# Patient Record
Sex: FEMALE | Race: OTHER | NOT HISPANIC OR LATINO | Employment: UNEMPLOYED | URBAN - METROPOLITAN AREA
[De-identification: names, ages, dates, MRNs, and addresses within clinical notes are randomized per-mention and may not be internally consistent; named-entity substitution may affect disease eponyms.]

---

## 2019-09-29 ENCOUNTER — HOSPITAL ENCOUNTER (OUTPATIENT)
Facility: HOSPITAL | Age: 24
Discharge: HOME | End: 2019-09-29
Attending: OBSTETRICS & GYNECOLOGY | Admitting: OBSTETRICS & GYNECOLOGY

## 2019-09-29 VITALS
SYSTOLIC BLOOD PRESSURE: 104 MMHG | OXYGEN SATURATION: 97 % | WEIGHT: 177.47 LBS | HEIGHT: 61 IN | BODY MASS INDEX: 33.51 KG/M2 | RESPIRATION RATE: 17 BRPM | TEMPERATURE: 97.7 F | HEART RATE: 94 BPM | DIASTOLIC BLOOD PRESSURE: 67 MMHG

## 2019-09-29 PROBLEM — O47.00 PRETERM CONTRACTIONS: Status: ACTIVE | Noted: 2019-09-29

## 2019-09-29 RX ORDER — FERROUS SULFATE TAB EC 324 MG (65 MG FE EQUIVALENT) 324 (65 FE) MG
324 TABLET DELAYED RESPONSE ORAL
COMMUNITY
End: 2022-06-06

## 2019-09-29 RX ORDER — PRENATAL VIT NO.126/IRON/FOLIC 28MG-0.8MG
1 TABLET ORAL DAILY
COMMUNITY
End: 2022-06-06

## 2019-09-30 ENCOUNTER — HOSPITAL ENCOUNTER (INPATIENT)
Facility: HOSPITAL | Age: 24
LOS: 2 days | Discharge: HOME | End: 2019-10-02
Attending: OBSTETRICS & GYNECOLOGY | Admitting: OBSTETRICS & GYNECOLOGY

## 2019-09-30 ENCOUNTER — APPOINTMENT (OUTPATIENT)
Dept: ULTRASOUND IMAGING | Facility: HOSPITAL | Age: 24
End: 2019-09-30

## 2019-09-30 ENCOUNTER — ANESTHESIA (OUTPATIENT)
Dept: LABOR AND DELIVERY | Facility: HOSPITAL | Age: 24
End: 2019-09-30

## 2019-09-30 ENCOUNTER — ANESTHESIA EVENT (OUTPATIENT)
Dept: LABOR AND DELIVERY | Facility: HOSPITAL | Age: 24
End: 2019-09-30

## 2019-09-30 PROBLEM — Z34.90 TERM PREGNANCY: Status: ACTIVE | Noted: 2019-09-30

## 2019-09-30 LAB
ABO GROUP BLD: NORMAL
AMPHET+METHAMPHET UR QL: NEGATIVE
BARBITURATES UR QL SCN: NEGATIVE
BASOPHILS # BLD AUTO: 0 10*3/MM3 (ref 0–0.2)
BASOPHILS NFR BLD AUTO: 0.3 % (ref 0–1.5)
BENZODIAZ UR QL SCN: NEGATIVE
BLD GP AB SCN SERPL QL: NEGATIVE
CANNABINOIDS SERPL QL: POSITIVE
COCAINE UR QL: NEGATIVE
CREAT UR-MCNC: 150.4 MG/DL
DEPRECATED RDW RBC AUTO: 43.8 FL (ref 37–54)
DEPRECATED RDW RBC AUTO: 44.6 FL (ref 37–54)
EOSINOPHIL # BLD AUTO: 0 10*3/MM3 (ref 0–0.4)
EOSINOPHIL # BLD MANUAL: 0.12 10*3/MM3 (ref 0–0.4)
EOSINOPHIL NFR BLD AUTO: 0 % (ref 0.3–6.2)
EOSINOPHIL NFR BLD MANUAL: 1 % (ref 0.3–6.2)
ERYTHROCYTE [DISTWIDTH] IN BLOOD BY AUTOMATED COUNT: 14.9 % (ref 12.3–15.4)
ERYTHROCYTE [DISTWIDTH] IN BLOOD BY AUTOMATED COUNT: 15 % (ref 12.3–15.4)
HCT VFR BLD AUTO: 33.6 % (ref 34–46.6)
HCT VFR BLD AUTO: 35.8 % (ref 34–46.6)
HGB BLD-MCNC: 11.3 G/DL (ref 12–15.9)
HGB BLD-MCNC: 11.4 G/DL (ref 12–15.9)
HIV1+2 AB SER QL: NORMAL
LYMPHOCYTES # BLD AUTO: 1 10*3/MM3 (ref 0.7–3.1)
LYMPHOCYTES # BLD MANUAL: 0.71 10*3/MM3 (ref 0.7–3.1)
LYMPHOCYTES NFR BLD AUTO: 6.6 % (ref 19.6–45.3)
LYMPHOCYTES NFR BLD MANUAL: 12 % (ref 5–12)
LYMPHOCYTES NFR BLD MANUAL: 6 % (ref 19.6–45.3)
MCH RBC QN AUTO: 26.9 PG (ref 26.6–33)
MCH RBC QN AUTO: 28 PG (ref 26.6–33)
MCHC RBC AUTO-ENTMCNC: 31.8 G/DL (ref 31.5–35.7)
MCHC RBC AUTO-ENTMCNC: 33.5 G/DL (ref 31.5–35.7)
MCV RBC AUTO: 83.6 FL (ref 79–97)
MCV RBC AUTO: 84.5 FL (ref 79–97)
METHADONE UR QL SCN: NEGATIVE
MONOCYTES # BLD AUTO: 0.6 10*3/MM3 (ref 0.1–0.9)
MONOCYTES # BLD AUTO: 1.42 10*3/MM3 (ref 0.1–0.9)
MONOCYTES NFR BLD AUTO: 3.6 % (ref 5–12)
MYELOCYTES NFR BLD MANUAL: 1 % (ref 0–0)
NEUTROPHILS # BLD AUTO: 14 10*3/MM3 (ref 1.7–7)
NEUTROPHILS # BLD AUTO: 9.44 10*3/MM3 (ref 1.7–7)
NEUTROPHILS NFR BLD AUTO: 89.5 % (ref 42.7–76)
NEUTROPHILS NFR BLD MANUAL: 76 % (ref 42.7–76)
NEUTS BAND NFR BLD MANUAL: 4 % (ref 0–5)
NRBC BLD AUTO-RTO: 0 /100 WBC (ref 0–0.2)
OPIATES UR QL: NEGATIVE
PCP UR QL SCN: NEGATIVE
PLAT MORPH BLD: NORMAL
PLATELET # BLD AUTO: 246 10*3/MM3 (ref 140–450)
PLATELET # BLD AUTO: 264 10*3/MM3 (ref 140–450)
PMV BLD AUTO: 8.4 FL (ref 6–12)
PMV BLD AUTO: 8.7 FL (ref 6–12)
RBC # BLD AUTO: 4.02 10*6/MM3 (ref 3.77–5.28)
RBC # BLD AUTO: 4.24 10*6/MM3 (ref 3.77–5.28)
RBC MORPH BLD: NORMAL
RH BLD: POSITIVE
SCAN SLIDE: NORMAL
T&S EXPIRATION DATE: NORMAL
TOXIC GRANULATION: ABNORMAL
WBC NRBC COR # BLD: 11.8 10*3/MM3 (ref 3.4–10.8)
WBC NRBC COR # BLD: 15.6 10*3/MM3 (ref 3.4–10.8)

## 2019-09-30 PROCEDURE — 80307 DRUG TEST PRSMV CHEM ANLYZR: CPT | Performed by: OBSTETRICS & GYNECOLOGY

## 2019-09-30 PROCEDURE — 85025 COMPLETE CBC W/AUTO DIFF WBC: CPT | Performed by: OBSTETRICS & GYNECOLOGY

## 2019-09-30 PROCEDURE — 86900 BLOOD TYPING SEROLOGIC ABO: CPT

## 2019-09-30 PROCEDURE — 86901 BLOOD TYPING SEROLOGIC RH(D): CPT | Performed by: OBSTETRICS & GYNECOLOGY

## 2019-09-30 PROCEDURE — 82570 ASSAY OF URINE CREATININE: CPT | Performed by: OBSTETRICS & GYNECOLOGY

## 2019-09-30 PROCEDURE — 25010000002 ROPIVACAINE PER 1 MG: Performed by: ANESTHESIOLOGY

## 2019-09-30 PROCEDURE — 86901 BLOOD TYPING SEROLOGIC RH(D): CPT

## 2019-09-30 PROCEDURE — 85007 BL SMEAR W/DIFF WBC COUNT: CPT | Performed by: OBSTETRICS & GYNECOLOGY

## 2019-09-30 PROCEDURE — G0432 EIA HIV-1/HIV-2 SCREEN: HCPCS | Performed by: OBSTETRICS & GYNECOLOGY

## 2019-09-30 PROCEDURE — 25010000002 GENTAMICIN PER 80 MG: Performed by: OBSTETRICS & GYNECOLOGY

## 2019-09-30 PROCEDURE — 25010000002 AMPICILLIN PER 500 MG: Performed by: OBSTETRICS & GYNECOLOGY

## 2019-09-30 PROCEDURE — 86900 BLOOD TYPING SEROLOGIC ABO: CPT | Performed by: OBSTETRICS & GYNECOLOGY

## 2019-09-30 PROCEDURE — 86850 RBC ANTIBODY SCREEN: CPT | Performed by: OBSTETRICS & GYNECOLOGY

## 2019-09-30 PROCEDURE — 25010000002 FENTANYL CITRATE (PF) 100 MCG/2ML SOLUTION 2 ML VIAL: Performed by: ANESTHESIOLOGY

## 2019-09-30 PROCEDURE — 76815 OB US LIMITED FETUS(S): CPT

## 2019-09-30 PROCEDURE — C1755 CATHETER, INTRASPINAL: HCPCS | Performed by: ANESTHESIOLOGY

## 2019-09-30 PROCEDURE — 25010000003 LIDOCAINE 1 % SOLUTION

## 2019-09-30 RX ORDER — SODIUM CHLORIDE, SODIUM LACTATE, POTASSIUM CHLORIDE, CALCIUM CHLORIDE 600; 310; 30; 20 MG/100ML; MG/100ML; MG/100ML; MG/100ML
125 INJECTION, SOLUTION INTRAVENOUS CONTINUOUS
Status: DISCONTINUED | OUTPATIENT
Start: 2019-09-30 | End: 2019-10-01

## 2019-09-30 RX ORDER — ACETAMINOPHEN 500 MG
1000 TABLET ORAL 3 TIMES DAILY
Status: DISCONTINUED | OUTPATIENT
Start: 2019-09-30 | End: 2019-10-01

## 2019-09-30 RX ORDER — ONDANSETRON 4 MG/1
4 TABLET, FILM COATED ORAL EVERY 6 HOURS PRN
Status: DISCONTINUED | OUTPATIENT
Start: 2019-09-30 | End: 2019-10-01

## 2019-09-30 RX ORDER — MISOPROSTOL 200 UG/1
800 TABLET ORAL AS NEEDED
Status: DISCONTINUED | OUTPATIENT
Start: 2019-09-30 | End: 2019-10-01

## 2019-09-30 RX ORDER — OXYTOCIN 10 [USP'U]/ML
INJECTION, SOLUTION INTRAMUSCULAR; INTRAVENOUS
Status: DISPENSED
Start: 2019-09-30 | End: 2019-10-01

## 2019-09-30 RX ORDER — ONDANSETRON 2 MG/ML
4 INJECTION INTRAMUSCULAR; INTRAVENOUS EVERY 6 HOURS PRN
Status: DISCONTINUED | OUTPATIENT
Start: 2019-09-30 | End: 2019-10-01

## 2019-09-30 RX ORDER — LIDOCAINE HYDROCHLORIDE AND EPINEPHRINE 10; 10 MG/ML; UG/ML
INJECTION, SOLUTION INFILTRATION; PERINEURAL AS NEEDED
Status: DISCONTINUED | OUTPATIENT
Start: 2019-09-30 | End: 2019-10-01 | Stop reason: SURG

## 2019-09-30 RX ORDER — SODIUM CHLORIDE 0.9 % (FLUSH) 0.9 %
3-10 SYRINGE (ML) INJECTION AS NEEDED
Status: DISCONTINUED | OUTPATIENT
Start: 2019-09-30 | End: 2019-10-01

## 2019-09-30 RX ORDER — EPHEDRINE SULFATE 50 MG/ML
5 INJECTION, SOLUTION INTRAVENOUS
Status: DISCONTINUED | OUTPATIENT
Start: 2019-09-30 | End: 2019-10-01

## 2019-09-30 RX ORDER — METHYLERGONOVINE MALEATE 0.2 MG/ML
200 INJECTION INTRAVENOUS ONCE AS NEEDED
Status: DISCONTINUED | OUTPATIENT
Start: 2019-09-30 | End: 2019-10-01

## 2019-09-30 RX ORDER — MISOPROSTOL 200 UG/1
TABLET ORAL
Status: DISCONTINUED
Start: 2019-09-30 | End: 2019-10-01 | Stop reason: WASHOUT

## 2019-09-30 RX ORDER — OXYTOCIN-SODIUM CHLORIDE 0.9% IV SOLN 30 UNIT/500ML 30-0.9/5 UT/ML-%
125 SOLUTION INTRAVENOUS CONTINUOUS PRN
Status: COMPLETED | OUTPATIENT
Start: 2019-10-01 | End: 2019-10-01

## 2019-09-30 RX ORDER — LIDOCAINE HYDROCHLORIDE 10 MG/ML
5 INJECTION, SOLUTION EPIDURAL; INFILTRATION; INTRACAUDAL; PERINEURAL AS NEEDED
Status: DISCONTINUED | OUTPATIENT
Start: 2019-09-30 | End: 2019-10-01

## 2019-09-30 RX ORDER — IBUPROFEN 600 MG/1
600 TABLET ORAL EVERY 6 HOURS PRN
Status: DISCONTINUED | OUTPATIENT
Start: 2019-09-30 | End: 2019-10-01

## 2019-09-30 RX ORDER — ACETAMINOPHEN 325 MG/1
650 TABLET ORAL EVERY 4 HOURS PRN
Status: DISCONTINUED | OUTPATIENT
Start: 2019-09-30 | End: 2019-10-01

## 2019-09-30 RX ORDER — TRANEXAMIC ACID 100 MG/ML
INJECTION, SOLUTION INTRAVENOUS
Status: DISCONTINUED
Start: 2019-09-30 | End: 2019-10-01 | Stop reason: WASHOUT

## 2019-09-30 RX ORDER — MAGNESIUM CARB/ALUMINUM HYDROX 105-160MG
30 TABLET,CHEWABLE ORAL ONCE AS NEEDED
Status: COMPLETED | OUTPATIENT
Start: 2019-09-30 | End: 2019-09-30

## 2019-09-30 RX ORDER — LIDOCAINE HYDROCHLORIDE 10 MG/ML
INJECTION, SOLUTION INFILTRATION; PERINEURAL
Status: COMPLETED
Start: 2019-09-30 | End: 2019-09-30

## 2019-09-30 RX ORDER — OXYTOCIN-SODIUM CHLORIDE 0.9% IV SOLN 30 UNIT/500ML 30-0.9/5 UT/ML-%
2 SOLUTION INTRAVENOUS
Status: DISCONTINUED | OUTPATIENT
Start: 2019-09-30 | End: 2019-10-01

## 2019-09-30 RX ORDER — SODIUM CHLORIDE 0.9 % (FLUSH) 0.9 %
3 SYRINGE (ML) INJECTION EVERY 12 HOURS SCHEDULED
Status: DISCONTINUED | OUTPATIENT
Start: 2019-09-30 | End: 2019-10-01

## 2019-09-30 RX ORDER — MORPHINE SULFATE 4 MG/ML
4 INJECTION, SOLUTION INTRAMUSCULAR; INTRAVENOUS
Status: DISCONTINUED | OUTPATIENT
Start: 2019-09-30 | End: 2019-10-01

## 2019-09-30 RX ORDER — CARBOPROST TROMETHAMINE 250 UG/ML
INJECTION, SOLUTION INTRAMUSCULAR
Status: DISCONTINUED
Start: 2019-09-30 | End: 2019-10-01 | Stop reason: WASHOUT

## 2019-09-30 RX ORDER — CARBOPROST TROMETHAMINE 250 UG/ML
250 INJECTION, SOLUTION INTRAMUSCULAR AS NEEDED
Status: DISCONTINUED | OUTPATIENT
Start: 2019-09-30 | End: 2019-10-01

## 2019-09-30 RX ORDER — METHYLERGONOVINE MALEATE 0.2 MG/ML
INJECTION INTRAVENOUS
Status: DISCONTINUED
Start: 2019-09-30 | End: 2019-10-01 | Stop reason: WASHOUT

## 2019-09-30 RX ORDER — OXYTOCIN-SODIUM CHLORIDE 0.9% IV SOLN 30 UNIT/500ML 30-0.9/5 UT/ML-%
250 SOLUTION INTRAVENOUS CONTINUOUS
Status: ACTIVE | OUTPATIENT
Start: 2019-10-01 | End: 2019-10-01

## 2019-09-30 RX ORDER — DIPHENHYDRAMINE HYDROCHLORIDE 50 MG/ML
12.5 INJECTION INTRAMUSCULAR; INTRAVENOUS EVERY 8 HOURS PRN
Status: DISCONTINUED | OUTPATIENT
Start: 2019-09-30 | End: 2019-10-01

## 2019-09-30 RX ORDER — OXYTOCIN-SODIUM CHLORIDE 0.9% IV SOLN 30 UNIT/500ML 30-0.9/5 UT/ML-%
999 SOLUTION INTRAVENOUS ONCE
Status: DISCONTINUED | OUTPATIENT
Start: 2019-09-30 | End: 2019-10-01 | Stop reason: HOSPADM

## 2019-09-30 RX ADMIN — AMPICILLIN SODIUM 2 G: 2 INJECTION, POWDER, FOR SOLUTION INTRAMUSCULAR; INTRAVENOUS at 20:20

## 2019-09-30 RX ADMIN — LIDOCAINE HYDROCHLORIDE 50 ML: 10 INJECTION, SOLUTION INFILTRATION; PERINEURAL at 23:47

## 2019-09-30 RX ADMIN — OXYTOCIN 2 MILLI-UNITS/MIN: 10 INJECTION INTRAVENOUS at 20:55

## 2019-09-30 RX ADMIN — ROPIVACAINE HYDROCHLORIDE 8 ML/HR: 2 INJECTION, SOLUTION EPIDURAL; INFILTRATION; PERINEURAL at 15:56

## 2019-09-30 RX ADMIN — SODIUM CHLORIDE, SODIUM LACTATE, POTASSIUM CHLORIDE, AND CALCIUM CHLORIDE 1000 ML: 600; 310; 30; 20 INJECTION, SOLUTION INTRAVENOUS at 14:16

## 2019-09-30 RX ADMIN — SODIUM CHLORIDE, SODIUM LACTATE, POTASSIUM CHLORIDE, AND CALCIUM CHLORIDE 125 ML/HR: 600; 310; 30; 20 INJECTION, SOLUTION INTRAVENOUS at 16:22

## 2019-09-30 RX ADMIN — Medication: at 21:05

## 2019-09-30 RX ADMIN — GENTAMICIN SULFATE 300 MG: 40 INJECTION, SOLUTION INTRAMUSCULAR; INTRAVENOUS at 21:05

## 2019-09-30 RX ADMIN — EPHEDRINE SULFATE 15 MG: 50 INJECTION, SOLUTION INTRAVENOUS at 16:19

## 2019-09-30 RX ADMIN — MINERAL OIL 30 ML: 1000 SOLUTION ORAL at 23:47

## 2019-09-30 RX ADMIN — LIDOCAINE HYDROCHLORIDE,EPINEPHRINE BITARTRATE 3 ML: 10; .01 INJECTION, SOLUTION INFILTRATION; PERINEURAL at 15:54

## 2019-09-30 RX ADMIN — SODIUM CHLORIDE, SODIUM LACTATE, POTASSIUM CHLORIDE, AND CALCIUM CHLORIDE 125 ML/HR: 600; 310; 30; 20 INJECTION, SOLUTION INTRAVENOUS at 19:15

## 2019-09-30 RX ADMIN — ROPIVACAINE HYDROCHLORIDE 7 ML: 2 INJECTION, SOLUTION EPIDURAL; INFILTRATION; PERINEURAL at 15:55

## 2019-09-30 RX ADMIN — ACETAMINOPHEN 1000 MG: 500 TABLET, FILM COATED ORAL at 18:13

## 2019-10-01 LAB
BASOPHILS # BLD AUTO: 0 10*3/MM3 (ref 0–0.2)
BASOPHILS NFR BLD AUTO: 0.2 % (ref 0–1.5)
DEPRECATED RDW RBC AUTO: 44.6 FL (ref 37–54)
EOSINOPHIL # BLD AUTO: 0 10*3/MM3 (ref 0–0.4)
EOSINOPHIL NFR BLD AUTO: 0.1 % (ref 0.3–6.2)
ERYTHROCYTE [DISTWIDTH] IN BLOOD BY AUTOMATED COUNT: 15 % (ref 12.3–15.4)
HCT VFR BLD AUTO: 29.5 % (ref 34–46.6)
HGB BLD-MCNC: 9.9 G/DL (ref 12–15.9)
LYMPHOCYTES # BLD AUTO: 1.3 10*3/MM3 (ref 0.7–3.1)
LYMPHOCYTES NFR BLD AUTO: 6.7 % (ref 19.6–45.3)
MCH RBC QN AUTO: 28.1 PG (ref 26.6–33)
MCHC RBC AUTO-ENTMCNC: 33.5 G/DL (ref 31.5–35.7)
MCV RBC AUTO: 83.9 FL (ref 79–97)
MONOCYTES # BLD AUTO: 1.7 10*3/MM3 (ref 0.1–0.9)
MONOCYTES NFR BLD AUTO: 8.3 % (ref 5–12)
NEUTROPHILS # BLD AUTO: 17 10*3/MM3 (ref 1.7–7)
NEUTROPHILS NFR BLD AUTO: 84.7 % (ref 42.7–76)
NRBC BLD AUTO-RTO: 0 /100 WBC (ref 0–0.2)
PLATELET # BLD AUTO: 223 10*3/MM3 (ref 140–450)
PMV BLD AUTO: 8.5 FL (ref 6–12)
RBC # BLD AUTO: 3.51 10*6/MM3 (ref 3.77–5.28)
WBC NRBC COR # BLD: 20.1 10*3/MM3 (ref 3.4–10.8)

## 2019-10-01 PROCEDURE — 85025 COMPLETE CBC W/AUTO DIFF WBC: CPT | Performed by: OBSTETRICS & GYNECOLOGY

## 2019-10-01 PROCEDURE — 88307 TISSUE EXAM BY PATHOLOGIST: CPT | Performed by: OBSTETRICS & GYNECOLOGY

## 2019-10-01 PROCEDURE — 25010000002 AMPICILLIN PER 500 MG: Performed by: OBSTETRICS & GYNECOLOGY

## 2019-10-01 PROCEDURE — 25010000003 AMPICILLIN PER 500 MG: Performed by: OBSTETRICS & GYNECOLOGY

## 2019-10-01 RX ORDER — HYDROCODONE BITARTRATE AND ACETAMINOPHEN 5; 325 MG/1; MG/1
1 TABLET ORAL EVERY 4 HOURS PRN
Status: DISCONTINUED | OUTPATIENT
Start: 2019-10-01 | End: 2019-10-02 | Stop reason: HOSPADM

## 2019-10-01 RX ORDER — DOCUSATE SODIUM 100 MG/1
100 CAPSULE, LIQUID FILLED ORAL 2 TIMES DAILY
Status: DISCONTINUED | OUTPATIENT
Start: 2019-10-01 | End: 2019-10-02 | Stop reason: HOSPADM

## 2019-10-01 RX ORDER — ONDANSETRON 4 MG/1
4 TABLET, FILM COATED ORAL EVERY 8 HOURS PRN
Status: DISCONTINUED | OUTPATIENT
Start: 2019-10-01 | End: 2019-10-02 | Stop reason: HOSPADM

## 2019-10-01 RX ORDER — SODIUM CHLORIDE 0.9 % (FLUSH) 0.9 %
1-10 SYRINGE (ML) INJECTION AS NEEDED
Status: DISCONTINUED | OUTPATIENT
Start: 2019-10-01 | End: 2019-10-02 | Stop reason: HOSPADM

## 2019-10-01 RX ORDER — PRENATAL VIT/IRON FUM/FOLIC AC 27MG-0.8MG
1 TABLET ORAL DAILY
Status: DISCONTINUED | OUTPATIENT
Start: 2019-10-01 | End: 2019-10-02 | Stop reason: HOSPADM

## 2019-10-01 RX ORDER — LANOLIN 100 %
OINTMENT (GRAM) TOPICAL
Status: DISCONTINUED | OUTPATIENT
Start: 2019-10-01 | End: 2019-10-02 | Stop reason: HOSPADM

## 2019-10-01 RX ORDER — IBUPROFEN 600 MG/1
600 TABLET ORAL EVERY 6 HOURS PRN
Status: DISCONTINUED | OUTPATIENT
Start: 2019-10-01 | End: 2019-10-02 | Stop reason: HOSPADM

## 2019-10-01 RX ADMIN — IBUPROFEN 600 MG: 600 TABLET ORAL at 01:23

## 2019-10-01 RX ADMIN — OXYTOCIN 125 ML/HR: 10 INJECTION INTRAVENOUS at 01:23

## 2019-10-01 RX ADMIN — BENZOCAINE 1 SPRAY: 11.4 AEROSOL, SPRAY TOPICAL at 01:23

## 2019-10-01 RX ADMIN — AMPICILLIN SODIUM 2 G: 2 INJECTION, POWDER, FOR SOLUTION INTRAMUSCULAR; INTRAVENOUS at 09:40

## 2019-10-01 RX ADMIN — PRENATAL VIT W/ FE FUMARATE-FA TAB 27-0.8 MG 1 TABLET: 27-0.8 TAB at 08:23

## 2019-10-01 RX ADMIN — DOCUSATE SODIUM 100 MG: 100 CAPSULE, LIQUID FILLED ORAL at 20:18

## 2019-10-01 RX ADMIN — IBUPROFEN 600 MG: 600 TABLET ORAL at 20:18

## 2019-10-01 RX ADMIN — WITCH HAZEL 1 PAD: 500 SOLUTION RECTAL; TOPICAL at 01:23

## 2019-10-01 RX ADMIN — AMPICILLIN SODIUM 2 G: 2 INJECTION, POWDER, FOR SOLUTION INTRAMUSCULAR; INTRAVENOUS at 03:49

## 2019-10-01 RX ADMIN — AMPICILLIN SODIUM 2 G: 2 INJECTION, POWDER, FOR SOLUTION INTRAMUSCULAR; INTRAVENOUS at 16:09

## 2019-10-01 RX ADMIN — IBUPROFEN 600 MG: 600 TABLET ORAL at 13:41

## 2019-10-01 RX ADMIN — DOCUSATE SODIUM 100 MG: 100 CAPSULE, LIQUID FILLED ORAL at 08:24

## 2019-10-01 NOTE — PAYOR COMM NOTE
"Ths is PA request for inpt maternity admission--see attached pa form.    AUTHORIZATION PENDING:   PLEASE CALL OR FAX DETERMINATION TO CONTACT BELOW. THANK YOU.     MICHELA SANTA RN  UTILIZATION REVIEW  [facility]  PH: [phone]  FX: [phone]    VERIFIED IP ORDER     MEETS IP CRITERIA PER MILLIMAN:  Vaginal Delivery  ORG: [id number] ([facility])   Operative Status Criteria  Inpatient     ------------------------  DELIVERY RECORD:    DELIVERY DATE/TIME: 19 @ 2337    GESTATIONAL AGE:  38+5   WKS    /PARA:     SEX: male    BIRTH Wt:  [birth year] GMS    LENGTH:    21  IN    APGARS: 79    TYPE: VAGINAL  ---------------------------------      Mirna Calix (24 y.o. Female)     Date of Birth Social Security Number Address Home Phone MRN    1995  [address] [place] [address] [place] [address] [phone] [id number]    Gnosticism Marital Status          None        Admission Date Admission Type Admitting Provider Attending Provider Department, Room/Bed    19 Urgent Barbara Carrizales MD Allen, Melissa Jarles, MD [facility] MOTHER BABY, M401/1    Discharge Date Discharge Disposition Discharge Destination                       Attending Provider:  Barbara Carrizales MD    Allergies:  No Known Allergies    Isolation:  None   Infection:  None   Code Status:  CPR    Ht:  154.9 cm (61\")   Wt:  79.3 kg (174 lb 13.2 oz)    Admission Cmt:  None   Principal Problem:  None                Active Insurance as of 2019     Primary Coverage     Payor Plan Insurance Group Employer/Plan Group    [employer] -[employer]CIAL [employer]ET[employer] BY [facility] [employer]YAK[employer]     Payor Plan Address Payor Plan Phone Number Payor Plan Fax Number Effective Dates    [address] [phone]  3/1/2019 - None Entered    [facility] [id number]       Subscriber Name Subscriber Birth Date Member ID       MIRNA CALIX 1995 [id number]                 Emergency Contacts      (Rel.) Home Phone Work Phone Mobile " Phone    NADIR CALIX (Spouse) [phone] -- [phone]               History & Physical      Barbara Carrizales MD at 19 1353          [facility]  Obstetric History and Physical     Chief Complaint: SROM    Subjective     Patient is a 24 y.o. female  currently at 38w5d, who presents with SROM at 10-11 am today.     Her prenatal care is c/b anemia.      Prenatal Information:       Past OB History:    none       Past Medical History: History reviewed. No pertinent past medical history.     Past Surgical History No past surgical history on file.     Family History: History reviewed. No pertinent family history.   Social History:  reports that she has never smoked. She has never used smokeless tobacco.   reports that she does not drink alcohol.   reports that she does not use drugs.        General ROS: Pertinent items are noted in HPI    Objective      Vitals:     Vitals:    19 1133   BP: 115/74   BP Location: Right arm   Patient Position: Lying   Pulse: 103   Resp: 16   Temp: 98.2 °F (36.8 °C)   TempSrc: Oral       Fetal Heart Rate Assessment:   140, mod variability    [place]:   Every 3-4 min     Physical Exam:     General Appearance:    Alert, cooperative, in no acute distress   Abdomen:     Soft, non-tender, EFW 6 1/2-7 lbs   Pelvic Exam:    Presentation: vtx    Cervix: 2/90/-1, clear fluid, pelvis clinically adequate   Extremities:   Moves all extremities well   Skin:   No bleeding, bruising or rash         Laboratory Results:   Lab Results (last 48 hours)     ** No results found for the last 48 hours. **             Assessment/Plan     Active Problems:    Term pregnancy         Assessment:  1.  Intrauterine pregnancy at 38w5d gestation with reassuring fetal status.    2.  Term SROM  3.  GBS status: Negative  4. FSR    Plan:  1. Vaginal anticipated  2. Pitocin if needed       Barbara Carrizales MD   2019   1:53 PM      Electronically signed by Barbara Carrizales MD at 19  "[phone]            Physician Progress Notes (last 24 hours) (Notes from 09/30/19 0633 through 10/01/19 0633)      Duyen Tobar MD at 09/30/19 2151          [facility]  Obstetric Progress Note    Subjective   No complaints    Objective     Vitals:  Vitals:    09/30/19 2040 09/30/19 2100 09/30/19 2110 09/30/19 2130   BP:  101/59  114/78   BP Location:       Patient Position:       Pulse:  (!) 122 (!) 127 (!) 132   Resp:       Temp:       TempSrc:       SpO2:       Weight: 79.3 kg (174 lb 13.2 oz)      Height: 154.9 cm (61\")        Flowsheet Rows      First Filed Value   Admission Height  154.9 cm (61\") Documented at 09/30/2019 2040   Admission Weight  79.3 kg (174 lb 13.2 oz) Documented at 09/30/2019 2040        No intake or output data in the 24 hours ending 09/30/19 2151    Fetal Heart Rate Assessment:   170s, rw/accels and good BTBV   [place]:  ctxs q 3 min    Physical Exam:  General: Patient is in no acute distress    Pelvic Exam: r/c/0; OP            Assessment/Plan     Active Problems:    Term pregnancy         Assessment:  1.  Intrauterine pregnancy at 38w5d gestation with reactive,  fetal status.    2.  labor  with ROM  3.  Obstetrical history significant for is non-contributory.  4.  GBS status: No results found for: STREPGPB    Plan:  1. expectant management  2. Plan of care has been reviewed with patient.  3.  Risks, benefits of treatment plan have been discussed.  4.  All questions have been answered.  5.  abx for possible chorio      Duyen Tobar MD  9/30/2019  9:51 PM        Electronically signed by Duyen Tobar MD at 09/30/19 2153     Duyen Tobar MD at 09/30/19 2003           [facility]  Obstetric Progress Note    Subjective   No c/o    Objective     Vitals:  Vitals:    09/30/19 1830 09/30/19 1900 09/30/19 1913 09/30/19 1925   BP: 104/65 106/60     BP Location:       Patient Position:       Pulse: 120 (!) 121  120   Resp:       Temp:   100.2 °F (37.9 °C)    TempSrc:   Axillary    SpO2: 100%    "       No intake or output data in the 24 hours ending 09/30/19 2003    Fetal Heart Rate Assessment:   160s, + accels,variability   [place]:  ctxs q 3 min    Physical Exam:  General: Patient is in no acute distress    Pelvic Exam: 8-9/c/0            Assessment/Plan     Active Problems:    Term pregnancy         Assessment:  1.  Intrauterine pregnancy at 38w5d gestation with reactive fetal status.    2.  labor  with ROM  3.  Obstetrical history significant for is non-contributory.  4.  GBS status: No results found for: STREPGPB    Plan:  1. Vaginal anticipated  2. Plan of care has been reviewed with patient.  3.  Risks, benefits of treatment plan have been discussed.  4.  All questions have been answered.  5.  Now prob had intra-amnionic infection; maternal pulse 120, fetal 160 and maternal temp 100.2; will start amp and carmen Tobar MD  9/30/2019  8:03 PM        Electronically signed by Duyen Tobar MD at 09/30/19 2005     Duyen Tobar MD at 09/30/19 1901          [facility]  Obstetric Progress Note    Subjective   No complaints    Objective     Vitals:  Vitals:    09/30/19 1630 09/30/19 1635 09/30/19 1642 09/30/19 1755   BP: 102/53 108/78 95/69    BP Location:       Patient Position:       Pulse: 119 (!) 121 (!) 134    Resp:       Temp:    99 °F (37.2 °C)   TempSrc:    Axillary   SpO2:           No intake or output data in the 24 hours ending 09/30/19 1901    Fetal Heart Rate Assessment:   Category 2 due to fetal tachycardia that did resolve with position change, O2, IV fluid bolus.  Mother received p.o. Tylenol as her temperature was 100.0 and she had previously received 3 doses of ephedrine.  Current fetal heart tracing is in the 150s and category 1  [place]:  Retractions every 3 minutes    Physical Exam:  General: Patient is in no acute distress    Pelvic Exam: Still 5 cm per RN            Assessment/Plan     Active Problems:    Term pregnancy         Assessment:  1.  Intrauterine pregnancy at 38w5d  gestation with reactive fetal status.    2.  labor  with ROM  3.  Obstetrical history significant for is non-contributory.  4.  GBS status: No results found for: STREPGPB    Plan:  1. Vaginal anticipated  2. Plan of care has been reviewed with patient.  3.  Risks, benefits of treatment plan have been discussed.  4.  All questions have been answered.  5.        Duyen Tobar MD  9/30/2019  7:01 PM        Electronically signed by Duyen Tobar MD at 09/30/19 1902     Duyen Tobar MD at 09/30/19 1715          [facility]  Obstetric Progress Note    Subjective   Comfy with epidural    Objective     Vitals:  Vitals:    09/30/19 1629 09/30/19 1630 09/30/19 1635 09/30/19 1642   BP: 99/50 102/53 108/78 95/69   BP Location:       Patient Position:       Pulse: 111 119 (!) 121 (!) 134   Resp:       Temp:       TempSrc:       SpO2:           No intake or output data in the 24 hours ending 09/30/19 1715    Fetal Heart Rate Assessment:   140s, cat I  [place]:  ctxs q 2-3     Physical Exam:  General: Patient is in no acute distress    Pelvic Exam: 5/c/-1; no bag felt c/w SROM            Assessment/Plan     Active Problems:    Term pregnancy         Assessment:  1.  Intrauterine pregnancy at 38w5d gestation with reactive fetal status.    2.  labor  with ROM  3.  Obstetrical history significant for is non-contributory.  4.  GBS status: No results found for: STREPGPB    Plan:  1. Vaginal anticipated  2. Plan of care has been reviewed with patient.  3.  Risks, benefits of treatment plan have been discussed.  4.  All questions have been answered.  5.        Duyen Tobar MD  9/30/2019  5:15 PM        Electronically signed by Duyen Tobar MD at 09/30/19 1717

## 2019-10-01 NOTE — PLAN OF CARE
Problem: Patient Care Overview  Goal: Plan of Care Review  Outcome: Ongoing (interventions implemented as appropriate)   10/01/19 0550   Coping/Psychosocial   Plan of Care Reviewed With patient   Plan of Care Review   Progress improving   OTHER   Outcome Summary Pt voiding qs.        Problem: Postpartum (Vaginal Delivery) (Adult,Obstetrics,Pediatric)  Goal: Signs and Symptoms of Listed Potential Problems Will be Absent, Minimized or Managed (Postpartum)  Outcome: Ongoing (interventions implemented as appropriate)   10/01/19 0550   Goal/Outcome Evaluation   Problems Assessed (Postpartum Vaginal Delivery) all   Problems Present (Postpartum Vag Deliv) none       Problem: Breastfeeding (Adult,Obstetrics,Pediatric)  Goal: Signs and Symptoms of Listed Potential Problems Will be Absent, Minimized or Managed (Breastfeeding)  Outcome: Ongoing (interventions implemented as appropriate)   10/01/19 0550   Goal/Outcome Evaluation   Problems Assessed (Breastfeeding) all   Problems Present (Breastfeeding) none

## 2019-10-01 NOTE — PROGRESS NOTES
" Ben  Obstetric Progress Note    Subjective   No complaints    Objective     Vitals:  Vitals:    09/30/19 2040 09/30/19 2100 09/30/19 2110 09/30/19 2130   BP:  101/59  114/78   BP Location:       Patient Position:       Pulse:  (!) 122 (!) 127 (!) 132   Resp:       Temp:       TempSrc:       SpO2:       Weight: 79.3 kg (174 lb 13.2 oz)      Height: 154.9 cm (61\")        Flowsheet Rows      First Filed Value   Admission Height  154.9 cm (61\") Documented at 09/30/2019 2040   Admission Weight  79.3 kg (174 lb 13.2 oz) Documented at 09/30/2019 2040        No intake or output data in the 24 hours ending 09/30/19 2151    Fetal Heart Rate Assessment:   170s, rw/accels and good BTBV   [place]:  ctxs q 3 min    Physical Exam:  General: Patient is in no acute distress    Pelvic Exam: r/c/0; OP            Assessment/Plan     Active Problems:    Term pregnancy         Assessment:  1.  Intrauterine pregnancy at 38w5d gestation with reactive,  fetal status.    2.  labor  with ROM  3.  Obstetrical history significant for is non-contributory.  4.  GBS status: No results found for: STREPGPB    Plan:  1. expectant management  2. Plan of care has been reviewed with patient.  3.  Risks, benefits of treatment plan have been discussed.  4.  All questions have been answered.  5.  abx for possible chorio      Duyen Tobar MD  9/30/2019  9:51 PM      "

## 2019-10-01 NOTE — LACTATION NOTE
This note was copied from a baby's chart.  Pt denies hx of breast surgery, no allergy to wool or foods. Medela hydrogel patches provided, instructed on use. Nipple cream provided. teaching done. Bf dvd watched. Does not have a pump, will call insurance for one. Assisted to position, latch, feed baby in rt foot ball hold, feeds well, difficulty latching to lt side in any hold and nipple tenderness.  Nipple shield offered to help in latch and shield pain., instructed on use and cleaning. wilkshantel call for help as needed.

## 2019-10-01 NOTE — PROGRESS NOTES
ELISHA Contreras  Postpartum Note    Subjective   Postpartum Day 1:  Spontaneous Vaginal Delivery    Patient without complaints. Her pain is well controlled with nonsteroidal anti-inflammatory drugs. She is ambulating well.  Patient describes her bleeding as thin lochia. Feeling ok. No further fevers. On antibiotics IV.     Breastfeeding: infant latching without difficulty.    Objective     Vitals:  Vitals:    10/01/19 0145 10/01/19 0200 10/01/19 0215 10/01/19 0725   BP: 124/83  104/67 99/65   BP Location:   Right arm Left arm   Patient Position:   Lying Lying   Pulse: 118  97 90   Resp:   16 17   Temp:  98.2 °F (36.8 °C) 98.9 °F (37.2 °C) 98.3 °F (36.8 °C)   TempSrc:  Axillary Oral Oral   SpO2: 99%  99% 98%   Weight:       Height:           Physical Exam:  General:  Alert and oriented x3. No acute distress.  Abdomen: abdomen is soft without significant tenderness, masses, organomegaly or guarding. Fundus: appropriate, firm, non tender  Incision: N/A  Skin: Warm, Dry  Extremities: Normal,  trace edema. Nontender     Labs:  Results from last 7 days   Lab Units 10/01/19  0507 09/30/19  1834 09/30/19  1417   WBC 10*3/mm3 20.10* 15.60* 11.80*   HEMOGLOBIN g/dL 9.9* 11.3* 11.4*   HEMATOCRIT % 29.5* 33.6* 35.8   PLATELETS 10*3/mm3 223 246 264            Feeding method: Breastfeeding Status: Yes     Blood Type: RH Positive        Assessment/Plan     Active Problems:    Term pregnancy      Mirna Bender is Day 1  post-partum from a  Spontaneous Vaginal Delivery      Plan:  routine and continue present management. Continue IV antibiotic x24h. If afebrile d/c antibiotics.  Repeat CBC in AM       Maddi Cota NP  10/1/2019  8:07 AM

## 2019-10-01 NOTE — L&D DELIVERY NOTE
[facility]  Vaginal Delivery Note    Diagnosis     Patient is a 24 y.o. female  currently at 38w6d, who presents with PROM and labor.      Delivery     Delivery:  Spontaneous Vaginal Delivery    Date of Delivery:  10/1/2019   Anesthesia:    Epidural   Delivering clinician: Duyen Tobar MD      Delivery narrative: Patient presented complaining of rupture membranes.  She was then found to be in labor.  She had oligohydramnios.  She had an epidural placed at 5 cm.  She developed a low-grade fever.  There was fetal tachycardia and maternal tachycardia that responded to p.o. Tylenol.  The tachycardia resumed after a couple of hours and her temperature was noted to be going up again.  Her white count was 15,000 and chorioamnionitis was diagnosed.  She was begun on ampicillin and gentamicin.  Fetal heart tracing was in the 170s but always with good accelerations and qqbh-lt-mgbr variability.  Progressed to complete and pushing.  The fetus was noted to be OP.  She was at 0 station.  She began pushing.  She was able to push the baby down to the pubic bone.  When she was pushing the baby was manually rotated to OA.  She pushed for a little over an hour and effected a delivery over an second right mediolateral episiotomy.  There is nuchal cord which was reduced induced.  The infant's shoulders were delivered in a transverse fashion.  The rest the infant was delivered and placed on mother's abdomen with the cord was clamped x2 and cut after pulsations stop.  Cord blood and gases were obtained.  The pH of the artery was 7.25 with a PCO2 of 43, PO2 of 24, bicarb of 19, base excess of -8, PSA 2 of 36%.  Each of the vein was 7.35, PCO2 was 31, PO2 was 29, bicarb was 17, base excess of -8, P O2 was 55%.  The placenta delivered spontaneous, intact, with a three-vessel cord.  Will be sent to pathology.  The secondary right mediolateral episiotomy was repaired using 3-0 Vicryl Rapide.  There was a small left sidewall laceration  which was repaired using 3-0 Vicryl Rapide.  Mother and infant are recovering well at the time of this dictation.    Infant    Findings: VMI     Apgars:       APGARS  One minute Five minutes Ten minutes Fifteen minutes Twenty minutes   Skin color: 0    1                 Heart rate: 2    2                 Grimace: 2    2                  Muscle tone: 2    2                  Breathin    2                  Totals: 7    9                        Placenta, Cord, and Fluid    Placenta delivered  spontaneous  3VC          Lacerations       had a 2nd degree lacteration, which was repair. with 3.0 Vicryl rapide in the usual fashion.     Estimated Blood Loss 300cc     Complications  amnionitis, maternal temperature    Disposition  Mother to Mother Baby/Postpartum  in stable condition currently.  Baby to remains with mom  in stable condition currently.  Antibiotics will be continued for 24 hours      Duyen Tobar MD  10/01/19  12:04 AM

## 2019-10-01 NOTE — PROGRESS NOTES
[facility] Contreras  Obstetric Progress Note    Subjective   No c/o    Objective     Vitals:  Vitals:    09/30/19 1830 09/30/19 1900 09/30/19 1913 09/30/19 1925   BP: 104/65 106/60     BP Location:       Patient Position:       Pulse: 120 (!) 121  120   Resp:       Temp:   100.2 °F (37.9 °C)    TempSrc:   Axillary    SpO2: 100%          No intake or output data in the 24 hours ending 09/30/19 2003    Fetal Heart Rate Assessment:   160s, + accels,variability   [place]:  ctxs q 3 min    Physical Exam:  General: Patient is in no acute distress    Pelvic Exam: 8-9/c/0            Assessment/Plan     Active Problems:    Term pregnancy         Assessment:  1.  Intrauterine pregnancy at 38w5d gestation with reactive fetal status.    2.  labor  with ROM  3.  Obstetrical history significant for is non-contributory.  4.  GBS status: No results found for: STREPGPB    Plan:  1. Vaginal anticipated  2. Plan of care has been reviewed with patient.  3.  Risks, benefits of treatment plan have been discussed.  4.  All questions have been answered.  5.  Now prob had intra-amnionic infection; maternal pulse 120, fetal 160 and maternal temp 100.2; will start amp and gent      Duyen Tobar MD  9/30/2019  8:03 PM

## 2019-10-01 NOTE — PLAN OF CARE
Problem: Patient Care Overview  Goal: Plan of Care Review  Outcome: Ongoing (interventions implemented as appropriate)      Problem: Labor (Cervical Ripen, Induct, Augment) (Adult,Obstetrics,Pediatric)  Goal: Signs and Symptoms of Listed Potential Problems Will be Absent, Minimized or Managed (Labor)  Outcome: Outcome(s) achieved Date Met: 10/01/19

## 2019-10-02 VITALS
OXYGEN SATURATION: 98 % | DIASTOLIC BLOOD PRESSURE: 74 MMHG | BODY MASS INDEX: 33.01 KG/M2 | SYSTOLIC BLOOD PRESSURE: 109 MMHG | WEIGHT: 174.82 LBS | TEMPERATURE: 98.1 F | HEIGHT: 61 IN | HEART RATE: 78 BPM | RESPIRATION RATE: 18 BRPM

## 2019-10-02 LAB
BASOPHILS # BLD AUTO: 0.1 10*3/MM3 (ref 0–0.2)
BASOPHILS NFR BLD AUTO: 0.5 % (ref 0–1.5)
DEPRECATED RDW RBC AUTO: 44.2 FL (ref 37–54)
EOSINOPHIL # BLD AUTO: 0.2 10*3/MM3 (ref 0–0.4)
EOSINOPHIL NFR BLD AUTO: 1.2 % (ref 0.3–6.2)
ERYTHROCYTE [DISTWIDTH] IN BLOOD BY AUTOMATED COUNT: 15 % (ref 12.3–15.4)
HCT VFR BLD AUTO: 30.5 % (ref 34–46.6)
HGB BLD-MCNC: 10.2 G/DL (ref 12–15.9)
LAB AP CASE REPORT: NORMAL
LYMPHOCYTES # BLD AUTO: 2.9 10*3/MM3 (ref 0.7–3.1)
LYMPHOCYTES NFR BLD AUTO: 19.8 % (ref 19.6–45.3)
MCH RBC QN AUTO: 28.2 PG (ref 26.6–33)
MCHC RBC AUTO-ENTMCNC: 33.5 G/DL (ref 31.5–35.7)
MCV RBC AUTO: 84.1 FL (ref 79–97)
MONOCYTES # BLD AUTO: 1 10*3/MM3 (ref 0.1–0.9)
MONOCYTES NFR BLD AUTO: 6.9 % (ref 5–12)
NEUTROPHILS # BLD AUTO: 10.4 10*3/MM3 (ref 1.7–7)
NEUTROPHILS NFR BLD AUTO: 71.6 % (ref 42.7–76)
NRBC BLD AUTO-RTO: 0.1 /100 WBC (ref 0–0.2)
PATH REPORT.FINAL DX SPEC: NORMAL
PATH REPORT.GROSS SPEC: NORMAL
PLATELET # BLD AUTO: 246 10*3/MM3 (ref 140–450)
PMV BLD AUTO: 8.3 FL (ref 6–12)
RBC # BLD AUTO: 3.62 10*6/MM3 (ref 3.77–5.28)
WBC NRBC COR # BLD: 14.5 10*3/MM3 (ref 3.4–10.8)

## 2019-10-02 PROCEDURE — 90715 TDAP VACCINE 7 YRS/> IM: CPT | Performed by: OBSTETRICS & GYNECOLOGY

## 2019-10-02 PROCEDURE — 25010000002 TDAP 5-2.5-18.5 LF-MCG/0.5 SUSPENSION: Performed by: OBSTETRICS & GYNECOLOGY

## 2019-10-02 PROCEDURE — 90686 IIV4 VACC NO PRSV 0.5 ML IM: CPT | Performed by: OBSTETRICS & GYNECOLOGY

## 2019-10-02 PROCEDURE — 85025 COMPLETE CBC W/AUTO DIFF WBC: CPT | Performed by: NURSE PRACTITIONER

## 2019-10-02 PROCEDURE — G0008 ADMIN INFLUENZA VIRUS VAC: HCPCS | Performed by: OBSTETRICS & GYNECOLOGY

## 2019-10-02 PROCEDURE — 25010000002 INFLUENZA VAC SPLIT QUAD 0.5 ML SUSPENSION PREFILLED SYRINGE: Performed by: OBSTETRICS & GYNECOLOGY

## 2019-10-02 PROCEDURE — 90471 IMMUNIZATION ADMIN: CPT | Performed by: OBSTETRICS & GYNECOLOGY

## 2019-10-02 RX ORDER — IBUPROFEN 600 MG/1
600 TABLET ORAL EVERY 6 HOURS PRN
Qty: 30 TABLET | Refills: 1 | Status: SHIPPED | OUTPATIENT
Start: 2019-10-02

## 2019-10-02 RX ADMIN — WITCH HAZEL 1 PAD: 500 SOLUTION RECTAL; TOPICAL at 17:27

## 2019-10-02 RX ADMIN — BENZOCAINE 1 SPRAY: 11.4 AEROSOL, SPRAY TOPICAL at 17:27

## 2019-10-02 RX ADMIN — IBUPROFEN 600 MG: 600 TABLET ORAL at 09:31

## 2019-10-02 RX ADMIN — INFLUENZA A VIRUS A/BRISBANE/02/2018 IVR-190 (H1N1) ANTIGEN (PROPIOLACTONE INACTIVATED), INFLUENZA A VIRUS A/KANSAS/14/2017 X-327 (H3N2) ANTIGEN (PROPIOLACTONE INACTIVATED), INFLUENZA B VIRUS B/MARYLAND/15/2016 ANTIGEN (PROPIOLACTONE INACTIVATED), INFLUENZA B VIRUS B/PHUKET/3073/2013 BVR-1B ANTIGEN (PROPIOLACTONE INACTIVATED) 0.5 ML: 15; 15; 15; 15 INJECTION, SUSPENSION INTRAMUSCULAR at 09:23

## 2019-10-02 RX ADMIN — PRENATAL VIT W/ FE FUMARATE-FA TAB 27-0.8 MG 1 TABLET: 27-0.8 TAB at 09:23

## 2019-10-02 RX ADMIN — IBUPROFEN 600 MG: 600 TABLET ORAL at 02:38

## 2019-10-02 RX ADMIN — IBUPROFEN 600 MG: 600 TABLET ORAL at 17:27

## 2019-10-02 RX ADMIN — DOCUSATE SODIUM 100 MG: 100 CAPSULE, LIQUID FILLED ORAL at 09:23

## 2019-10-02 RX ADMIN — TETANUS TOXOID, REDUCED DIPHTHERIA TOXOID AND ACELLULAR PERTUSSIS VACCINE, ADSORBED 0.5 ML: 5; 2.5; 8; 8; 2.5 SUSPENSION INTRAMUSCULAR at 09:23

## 2019-10-02 NOTE — LACTATION NOTE
This note was copied from a baby's chart.  Pt states she has had some good feeding sessions. Teaching done, bf dvd requested for dad to watch

## 2019-10-02 NOTE — DISCHARGE SUMMARY
[facility]  Delivery Discharge Summary    Primary OB Clinician: Barbara Carrizales MD    Admission Diagnosis:  Active Problems:    Term pregnancy      Discharge Diagnosis:  Delivered     Gestational Age: 38w5d    Date of Delivery: 2019     Delivered By:  Duyen Tobar     Delivery Type: Vaginal, Spontaneous      Intrapartum Course: Uncomplicated delivery.     Postpartum Course:  Patient is a 24 y.o. female  admitted at 38w6d, with SROM and labor. During labor she developed a low grade fever. There was fetal tachycardia and maternal tachycardia noted. She had an elevated WBC and chorioamnionitis was diagnosed. She was started on antibiotics and underwent  Spontaneous Vaginal Delivery. Pt was transferred to PP. WBC on PPD#1 increased from 15 to 20, but she remained afebrile following delivery. She continued on IV antibiotics x 24hrs and has remained afebrile. WBC on day of dc decreased to 14. She had scant lochia and pain was well controlled. Pt d/c home in stable condition and will f/u in office for PP visit as scheduled or PRN. Currently breastfeeding. Unsure of plans for contraception at this time. Will discuss further at PP visit,  is planning on vasectomy.      Physical Exam:    Vitals:   Vitals:    10/01/19 0215 10/01/19 0725 10/01/19 1120 10/01/19 2315   BP: 104/67 99/65 106/72 100/70   BP Location: Right arm Left arm Right arm Left arm   Patient Position: Lying Lying Lying Sitting   Pulse: 97 90 95 76   Resp: 16 17 17 18   Temp: 98.9 °F (37.2 °C) 98.3 °F (36.8 °C) 98.2 °F (36.8 °C) 98.1 °F (36.7 °C)   TempSrc: Oral Oral Oral Oral   SpO2: 99% 98% 98% 98%   Weight:       Height:         Temp (24hrs), Av.2 °F (36.8 °C), Min:98.1 °F (36.7 °C), Max:98.3 °F (36.8 °C)      General Appearance:    Alert, cooperative, in no acute distress   Abdomen:     Soft non-tender, non-distended, no guarding, no rebound         tenderness.   Extremities:   Moves all extremities well, no edema, no cyanosis,  no              Redness.   Incision:  N/A   Fundus:   Firm, below umbilicus     Feeding method: Breastfeeding Status: Yes    Labs:  Results from last 7 days   Lab Units 10/02/19  0538 10/01/19  0507 09/30/19  1834   WBC 10*3/mm3 14.50* 20.10* 15.60*   HEMOGLOBIN g/dL 10.2* 9.9* 11.3*   HEMATOCRIT % 30.5* 29.5* 33.6*   PLATELETS 10*3/mm3 246 223 246           Blood Type: RH Positive      Plan:  Discharge to home.    Follow-up appointment with Dr Carrizales in 6 week.    Maddi Cota NP  10/2/2019  6:24 AM

## 2019-10-02 NOTE — LACTATION NOTE
This note was copied from a baby's chart.  Pt visited, states she continues working on improving feedings. Teaching done, questions answered. Will call for help as needed.

## 2019-10-02 NOTE — PLAN OF CARE
Problem: Patient Care Overview  Goal: Plan of Care Review  Outcome: Ongoing (interventions implemented as appropriate)   10/02/19 0443   Coping/Psychosocial   Plan of Care Reviewed With patient   Plan of Care Review   Progress improving   OTHER   Outcome Summary Pt pain is being controlled with Motrin at this time. Pt recieved last dose of antibiotics. Breastfeeding is going better this shift.        Problem: Postpartum (Vaginal Delivery) (Adult,Obstetrics,Pediatric)  Goal: Signs and Symptoms of Listed Potential Problems Will be Absent, Minimized or Managed (Postpartum)  Outcome: Ongoing (interventions implemented as appropriate)   10/02/19 0443   Goal/Outcome Evaluation   Problems Assessed (Postpartum Vaginal Delivery) all   Problems Present (Postpartum Vag Deliv) pain       Problem: Breastfeeding (Adult,Obstetrics,Pediatric)  Goal: Signs and Symptoms of Listed Potential Problems Will be Absent, Minimized or Managed (Breastfeeding)  Outcome: Ongoing (interventions implemented as appropriate)   10/02/19 0443   Goal/Outcome Evaluation   Problems Assessed (Breastfeeding) all   Problems Present (Breastfeeding) none

## 2021-12-14 ENCOUNTER — LAB (OUTPATIENT)
Dept: LAB | Facility: HOSPITAL | Age: 26
End: 2021-12-14

## 2021-12-14 ENCOUNTER — TRANSCRIBE ORDERS (OUTPATIENT)
Dept: ADMINISTRATIVE | Facility: HOSPITAL | Age: 26
End: 2021-12-14

## 2021-12-14 DIAGNOSIS — Z11.52 ENCOUNTER FOR SCREENING FOR SEVERE ACUTE RESPIRATORY SYNDROME CORONAVIRUS 2 (SARS-COV-2) INFECTION: Primary | ICD-10-CM

## 2021-12-14 DIAGNOSIS — Z11.52 ENCOUNTER FOR SCREENING FOR SEVERE ACUTE RESPIRATORY SYNDROME CORONAVIRUS 2 (SARS-COV-2) INFECTION: ICD-10-CM

## 2021-12-14 LAB — SARS-COV-2 ORF1AB RESP QL NAA+PROBE: NOT DETECTED

## 2021-12-14 PROCEDURE — U0004 COV-19 TEST NON-CDC HGH THRU: HCPCS

## 2021-12-14 PROCEDURE — C9803 HOPD COVID-19 SPEC COLLECT: HCPCS

## 2021-12-28 ENCOUNTER — TRANSCRIBE ORDERS (OUTPATIENT)
Dept: ADMINISTRATIVE | Facility: HOSPITAL | Age: 26
End: 2021-12-28

## 2021-12-28 ENCOUNTER — LAB (OUTPATIENT)
Dept: LAB | Facility: HOSPITAL | Age: 26
End: 2021-12-28

## 2021-12-28 DIAGNOSIS — Z11.52 ENCOUNTER FOR SCREENING FOR SEVERE ACUTE RESPIRATORY SYNDROME CORONAVIRUS 2 (SARS-COV-2) INFECTION: Primary | ICD-10-CM

## 2021-12-28 DIAGNOSIS — Z11.52 ENCOUNTER FOR SCREENING FOR SEVERE ACUTE RESPIRATORY SYNDROME CORONAVIRUS 2 (SARS-COV-2) INFECTION: ICD-10-CM

## 2021-12-28 PROCEDURE — U0004 COV-19 TEST NON-CDC HGH THRU: HCPCS

## 2021-12-28 PROCEDURE — C9803 HOPD COVID-19 SPEC COLLECT: HCPCS

## 2021-12-29 LAB — SARS-COV-2 ORF1AB RESP QL NAA+PROBE: NOT DETECTED

## 2022-06-06 ENCOUNTER — OFFICE VISIT (OUTPATIENT)
Dept: FAMILY MEDICINE CLINIC | Facility: CLINIC | Age: 27
End: 2022-06-06

## 2022-06-06 VITALS
SYSTOLIC BLOOD PRESSURE: 117 MMHG | HEART RATE: 67 BPM | OXYGEN SATURATION: 99 % | RESPIRATION RATE: 10 BRPM | DIASTOLIC BLOOD PRESSURE: 80 MMHG | BODY MASS INDEX: 31.34 KG/M2 | WEIGHT: 166 LBS | HEIGHT: 61 IN

## 2022-06-06 DIAGNOSIS — L91.8 SKIN TAGS, MULTIPLE ACQUIRED: ICD-10-CM

## 2022-06-06 DIAGNOSIS — Z76.89 ENCOUNTER TO ESTABLISH CARE: Primary | ICD-10-CM

## 2022-06-06 PROCEDURE — 99203 OFFICE O/P NEW LOW 30 MIN: CPT | Performed by: NURSE PRACTITIONER

## 2022-06-06 RX ORDER — MULTIVIT WITH MINERALS/LUTEIN
500 TABLET ORAL DAILY
COMMUNITY

## 2022-06-06 NOTE — PROGRESS NOTES
"Chief Complaint  Establish Care    Subjective          Mirna Bender presents to [facility]  History of Present Illness  Is a new patient, here today to establish care  She denies any chronic health problems  She does have a lesion on her left upper back  She first noticed it in 2019 and it has gotten larger    Has a 1 1/2 year old, has not seen the gyn since her postpartum follow up, encouraged to resume routine care/pelvic exam and pap    FH  HTN - mothers side of family, not mother  No other known  FH    Review of Systems   Constitutional: Negative for activity change, appetite change and fatigue.   Eyes: Negative.    Respiratory: Negative.  Negative for cough, shortness of breath and wheezing.    Cardiovascular: Negative.  Negative for chest pain and palpitations.   Gastrointestinal: Negative.  Negative for abdominal pain, constipation, diarrhea, nausea and vomiting.   Genitourinary: Negative.    Skin:        Lesion on posterior left upper arm   Neurological: Positive for headaches. Negative for dizziness and weakness.        Occasional headache, easily relieved with ibuprofen   Psychiatric/Behavioral: Negative.  Negative for dysphoric mood and sleep disturbance. The patient is not nervous/anxious.          Objective   Vital Signs:  /80   Pulse 67   Resp 10   Ht 154.9 cm (61\")   Wt 75.3 kg (166 lb)   SpO2 99%   BMI 31.37 kg/m²     BP Readings from Last 3 Encounters:   06/06/22 117/80   10/02/19 109/74   09/29/19 104/67        Wt Readings from Last 3 Encounters:   06/06/22 75.3 kg (166 lb)   09/30/19 79.3 kg (174 lb 13.2 oz)   09/29/19 80.5 kg (177 lb 7.5 oz)        BMI has not been calculated during today's encounter.       Physical Exam  Vitals reviewed.   Constitutional:       Appearance: Normal appearance.   Neck:      Vascular: No carotid bruit.   Cardiovascular:      Pulses: Normal pulses.      Heart sounds: Normal heart sounds.   Pulmonary:      Effort: " Pulmonary effort is normal.      Breath sounds: Normal breath sounds.   Musculoskeletal:      Cervical back: Neck supple.      Right lower leg: No edema.      Left lower leg: No edema.   Skin:     General: Skin is warm.      Comments: Multiple small 1-2mm skin tags on the neck and axilla, single large lesion on the posterior left upper arm which is pendulous with a wide base   Neurological:      Mental Status: She is alert and oriented to person, place, and time.        Result Review :                 Assessment and Plan    Diagnoses and all orders for this visit:    1. Encounter to establish care (Primary)    2. Skin tags, multiple acquired  -     Ambulatory Referral to Dermatology             Follow Up   Return as needed, for Annual physical at your convenience.  Patient was given instructions and counseling regarding her condition or for health maintenance advice. Please see specific information pulled into the AVS if appropriate.

## 2022-09-07 ENCOUNTER — LAB (OUTPATIENT)
Dept: FAMILY MEDICINE CLINIC | Facility: CLINIC | Age: 27
End: 2022-09-07

## 2022-09-07 ENCOUNTER — OFFICE VISIT (OUTPATIENT)
Dept: FAMILY MEDICINE CLINIC | Facility: CLINIC | Age: 27
End: 2022-09-07

## 2022-09-07 VITALS
RESPIRATION RATE: 16 BRPM | SYSTOLIC BLOOD PRESSURE: 118 MMHG | BODY MASS INDEX: 30.4 KG/M2 | HEIGHT: 61 IN | OXYGEN SATURATION: 97 % | HEART RATE: 77 BPM | DIASTOLIC BLOOD PRESSURE: 80 MMHG | WEIGHT: 161 LBS

## 2022-09-07 DIAGNOSIS — Z00.00 ANNUAL PHYSICAL EXAM: ICD-10-CM

## 2022-09-07 DIAGNOSIS — Z00.00 ANNUAL PHYSICAL EXAM: Primary | ICD-10-CM

## 2022-09-07 DIAGNOSIS — G43.809 OTHER MIGRAINE WITHOUT STATUS MIGRAINOSUS, NOT INTRACTABLE: ICD-10-CM

## 2022-09-07 PROCEDURE — 87086 URINE CULTURE/COLONY COUNT: CPT | Performed by: NURSE PRACTITIONER

## 2022-09-07 PROCEDURE — 99395 PREV VISIT EST AGE 18-39: CPT | Performed by: NURSE PRACTITIONER

## 2022-09-07 PROCEDURE — 85027 COMPLETE CBC AUTOMATED: CPT | Performed by: NURSE PRACTITIONER

## 2022-09-07 PROCEDURE — 80053 COMPREHEN METABOLIC PANEL: CPT | Performed by: NURSE PRACTITIONER

## 2022-09-07 PROCEDURE — 36415 COLL VENOUS BLD VENIPUNCTURE: CPT

## 2022-09-07 PROCEDURE — 81001 URINALYSIS AUTO W/SCOPE: CPT | Performed by: NURSE PRACTITIONER

## 2022-09-07 RX ORDER — PROMETHAZINE HYDROCHLORIDE 12.5 MG/1
12.5 TABLET ORAL EVERY 6 HOURS PRN
Qty: 12 TABLET | Refills: 0 | Status: SHIPPED | OUTPATIENT
Start: 2022-09-07

## 2022-09-07 RX ORDER — SUMATRIPTAN 50 MG/1
50 TABLET, FILM COATED ORAL ONCE AS NEEDED
Qty: 10 TABLET | Refills: 1 | Status: SHIPPED | OUTPATIENT
Start: 2022-09-07

## 2022-09-07 NOTE — PROGRESS NOTES
"Chief Complaint  Annual Exam    Subjective          Mirna Bender presents to [facility]  History of Present Illness    Is here today for annual exam    Is having episodes of left sided headache associated with chills, sweating, vomiting and dizziness  It lasts about 30 minutes, takes a nap to help alleviate the pain  Has used tylenol or ibuprofen as well along with the nap   She does endorse sensitivity to light    Is otherwise feeling well    She is active at work, does not exercise regularly    She endorses that her diet could be improved, she consumes a lot of rice/carbs, could increase her vegetable intake      Review of Systems   Constitutional: Negative for appetite change and fatigue.   Respiratory: Negative.  Negative for chest tightness and shortness of breath.    Cardiovascular: Negative.  Negative for chest pain.   Gastrointestinal: Negative for constipation and diarrhea.   Genitourinary: Negative.  Negative for dysuria, frequency and urgency.   Musculoskeletal: Negative for arthralgias and myalgias.        Endorses foot pain when she has to walk a lot at work   Neurological: Positive for dizziness and headaches. Negative for weakness.        Headaches associated with dizziness   Psychiatric/Behavioral: Negative for dysphoric mood and sleep disturbance. The patient is not nervous/anxious.         Occasionally has a hard time falling asleep, uses melatonin with relief       Objective   Vital Signs:  /80 (BP Location: Left arm, Patient Position: Sitting)   Pulse 77   Resp 16   Ht 154.9 cm (60.98\")   Wt 73 kg (161 lb)   SpO2 97%   BMI 30.44 kg/m²     BP Readings from Last 3 Encounters:   09/07/22 118/80   06/06/22 117/80   10/02/19 109/74        Wt Readings from Last 3 Encounters:   09/07/22 73 kg (161 lb)   06/06/22 75.3 kg (166 lb)   09/30/19 79.3 kg (174 lb 13.2 oz)              Physical Exam  Vitals reviewed.   Constitutional:       Appearance: Normal " appearance. She is well-developed and well-groomed.   HENT:      Head: Normocephalic.      Right Ear: Tympanic membrane and ear canal normal.      Left Ear: Tympanic membrane and ear canal normal.      Nose: Nose normal.      Mouth/Throat:      Mouth: Mucous membranes are moist.      Pharynx: Oropharynx is clear.   Eyes:      Extraocular Movements: Extraocular movements intact.   Neck:      Vascular: No carotid bruit.   Cardiovascular:      Rate and Rhythm: Normal rate and regular rhythm.      Pulses: Normal pulses.      Heart sounds: Normal heart sounds.   Pulmonary:      Effort: Pulmonary effort is normal.      Breath sounds: Normal breath sounds.   Abdominal:      General: Bowel sounds are normal.      Palpations: Abdomen is soft.   Musculoskeletal:         General: Normal range of motion.      Cervical back: Normal range of motion and neck supple. No tenderness.      Right lower leg: No edema.      Left lower leg: No edema.   Lymphadenopathy:      Cervical: No cervical adenopathy.   Skin:     General: Skin is warm.   Neurological:      Mental Status: She is alert and oriented to person, place, and time.   Psychiatric:         Mood and Affect: Mood normal.        Result Review :                 Assessment and Plan    Diagnoses and all orders for this visit:    1. Annual physical exam (Primary)  -     Comprehensive metabolic panel; Future  -     CBC No Differential; Future  -     Urinalysis With Culture If Indicated - Urine, Clean Catch; Future    2. Other migraine without status migrainosus, not intractable  -     SUMAtriptan (Imitrex) 50 MG tablet; Take 1 tablet by mouth 1 (One) Time As Needed for Migraine for up to 1 dose. Take one tablet at onset of headache. May repeat dose one time in 2 hours if headache not relieved.  Dispense: 10 tablet; Refill: 1    Other orders  -     promethazine (PHENERGAN) 12.5 MG tablet; Take 1 tablet by mouth Every 6 (Six) Hours As Needed for Nausea or Vomiting.  Dispense: 12  tablet; Refill: 0           Follow Up   Return in about 1 year (around 9/7/2023) for Annual physical.  Patient was given instructions and counseling regarding her condition or for health maintenance advice. Please see specific information pulled into the AVS if appropriate.

## 2022-09-08 LAB
ALBUMIN SERPL-MCNC: 4.5 G/DL (ref 3.5–5.2)
ALBUMIN/GLOB SERPL: 1.4 G/DL
ALP SERPL-CCNC: 85 U/L (ref 39–117)
ALT SERPL W P-5'-P-CCNC: 44 U/L (ref 1–33)
ANION GAP SERPL CALCULATED.3IONS-SCNC: 10.5 MMOL/L (ref 5–15)
AST SERPL-CCNC: 27 U/L (ref 1–32)
BACTERIA UR QL AUTO: ABNORMAL /HPF
BILIRUB SERPL-MCNC: 0.5 MG/DL (ref 0–1.2)
BILIRUB UR QL STRIP: NEGATIVE
BUN SERPL-MCNC: 11 MG/DL (ref 6–20)
BUN/CREAT SERPL: 12.2 (ref 7–25)
CALCIUM SPEC-SCNC: 9.7 MG/DL (ref 8.6–10.5)
CHLORIDE SERPL-SCNC: 103 MMOL/L (ref 98–107)
CLARITY UR: CLEAR
CO2 SERPL-SCNC: 24.5 MMOL/L (ref 22–29)
COLOR UR: YELLOW
CREAT SERPL-MCNC: 0.9 MG/DL (ref 0.57–1)
DEPRECATED RDW RBC AUTO: 41.2 FL (ref 37–54)
EGFRCR SERPLBLD CKD-EPI 2021: 89.5 ML/MIN/1.73
ERYTHROCYTE [DISTWIDTH] IN BLOOD BY AUTOMATED COUNT: 13.4 % (ref 12.3–15.4)
GLOBULIN UR ELPH-MCNC: 3.2 GM/DL
GLUCOSE SERPL-MCNC: 80 MG/DL (ref 65–99)
GLUCOSE UR STRIP-MCNC: NEGATIVE MG/DL
HCT VFR BLD AUTO: 41.5 % (ref 34–46.6)
HGB BLD-MCNC: 13.3 G/DL (ref 12–15.9)
HGB UR QL STRIP.AUTO: NEGATIVE
HYALINE CASTS UR QL AUTO: ABNORMAL /LPF
KETONES UR QL STRIP: NEGATIVE
LEUKOCYTE ESTERASE UR QL STRIP.AUTO: ABNORMAL
MCH RBC QN AUTO: 26.9 PG (ref 26.6–33)
MCHC RBC AUTO-ENTMCNC: 32 G/DL (ref 31.5–35.7)
MCV RBC AUTO: 84 FL (ref 79–97)
NITRITE UR QL STRIP: NEGATIVE
PH UR STRIP.AUTO: 7.5 [PH] (ref 5–8)
PLATELET # BLD AUTO: 240 10*3/MM3 (ref 140–450)
PMV BLD AUTO: 12.5 FL (ref 6–12)
POTASSIUM SERPL-SCNC: 3.8 MMOL/L (ref 3.5–5.2)
PROT SERPL-MCNC: 7.7 G/DL (ref 6–8.5)
PROT UR QL STRIP: NEGATIVE
RBC # BLD AUTO: 4.94 10*6/MM3 (ref 3.77–5.28)
RBC # UR STRIP: ABNORMAL /HPF
REF LAB TEST METHOD: ABNORMAL
SODIUM SERPL-SCNC: 138 MMOL/L (ref 136–145)
SP GR UR STRIP: 1.02 (ref 1–1.03)
SQUAMOUS #/AREA URNS HPF: ABNORMAL /HPF
UROBILINOGEN UR QL STRIP: ABNORMAL
WBC # UR STRIP: ABNORMAL /HPF
WBC NRBC COR # BLD: 9.5 10*3/MM3 (ref 3.4–10.8)

## 2022-09-09 LAB — BACTERIA SPEC AEROBE CULT: NO GROWTH

## 2023-05-18 ENCOUNTER — OFFICE VISIT (OUTPATIENT)
Dept: FAMILY MEDICINE CLINIC | Facility: CLINIC | Age: 28
End: 2023-05-18
Payer: COMMERCIAL

## 2023-05-18 VITALS
RESPIRATION RATE: 16 BRPM | HEART RATE: 78 BPM | BODY MASS INDEX: 32.1 KG/M2 | OXYGEN SATURATION: 100 % | HEIGHT: 61 IN | WEIGHT: 170 LBS

## 2023-05-18 DIAGNOSIS — J02.9 SORE THROAT: Primary | ICD-10-CM

## 2023-05-18 LAB
EXPIRATION DATE: NORMAL
INTERNAL CONTROL: NORMAL
Lab: NORMAL
S PYO AG THROAT QL: NEGATIVE

## 2023-05-18 PROCEDURE — 99213 OFFICE O/P EST LOW 20 MIN: CPT | Performed by: NURSE PRACTITIONER

## 2023-05-18 PROCEDURE — 87880 STREP A ASSAY W/OPTIC: CPT | Performed by: NURSE PRACTITIONER

## 2023-05-18 RX ORDER — OMEPRAZOLE 20 MG/1
20 CAPSULE, DELAYED RELEASE ORAL DAILY
Qty: 14 CAPSULE | Refills: 0 | Status: SHIPPED | OUTPATIENT
Start: 2023-05-18

## 2023-05-18 NOTE — PROGRESS NOTES
"Chief Complaint  Sore Throat    Kamla Bender presents to [facility]  History of Present Illness    Is here today with c/o sore throat since March  She initially had a fever with it and was seen in the , she was strep positive and treated with antibiotics at that time    She does endorse that her throat improved after that, but that it has continued to be sore    Review of Systems   Constitutional: Negative.  Negative for appetite change, fatigue and fever.   HENT: Positive for sore throat and trouble swallowing. Negative for congestion, ear pain, postnasal drip and rhinorrhea.         Hurts to swallow, throat pain is bilateral   Eyes: Negative.    Respiratory: Negative.  Negative for shortness of breath.    Cardiovascular: Negative.  Negative for chest pain.   Gastrointestinal: Negative.  Negative for diarrhea, nausea and vomiting.        She denies any reflux symptoms   Musculoskeletal: Negative.  Negative for arthralgias and myalgias.   Neurological: Negative.  Negative for dizziness, weakness and headaches.     She has not tried any otc meds to help her sore throat pain  She does endorse that she has been drinking more fluids and avoiding any sugary foods or drinks as the sugary things seem to make it worse    Objective   Vital Signs:  Pulse 78   Resp 16   Ht 154.9 cm (60.98\")   Wt 77.1 kg (170 lb)   SpO2 100%   BMI 32.14 kg/m²     BP Readings from Last 3 Encounters:   03/31/23 107/77   09/07/22 118/80   06/06/22 117/80        Wt Readings from Last 3 Encounters:   05/18/23 77.1 kg (170 lb)   03/31/23 74.8 kg (165 lb)   09/07/22 73 kg (161 lb)              Physical Exam  Vitals reviewed.   Constitutional:       Appearance: Normal appearance.   HENT:      Right Ear: Tympanic membrane and ear canal normal.      Left Ear: Tympanic membrane and ear canal normal.      Nose: Nose normal.      Mouth/Throat:      Mouth: Mucous membranes are moist.      " Pharynx: Posterior oropharyngeal erythema present. No oropharyngeal exudate.   Cardiovascular:      Rate and Rhythm: Normal rate and regular rhythm.      Pulses: Normal pulses.      Heart sounds: Normal heart sounds.   Pulmonary:      Effort: Pulmonary effort is normal.      Breath sounds: Normal breath sounds.   Musculoskeletal:      Cervical back: Neck supple. No tenderness.   Lymphadenopathy:      Cervical: No cervical adenopathy.   Skin:     General: Skin is warm.   Neurological:      Mental Status: She is alert and oriented to person, place, and time.   Psychiatric:         Mood and Affect: Mood normal.        Result Review :                 Assessment and Plan    Diagnoses and all orders for this visit:    1. Sore throat (Primary)  -     POCT rapid strep A  -     omeprazole (priLOSEC) 20 MG capsule; Take 1 capsule by mouth Daily.  Dispense: 14 capsule; Refill: 0             Follow Up   Return if symptoms worsen or fail to improve.  Patient was given instructions and counseling regarding her condition or for health maintenance advice. Please see specific information pulled into the AVS if appropriate.